# Patient Record
Sex: MALE | Race: OTHER | Employment: FULL TIME | URBAN - METROPOLITAN AREA
[De-identification: names, ages, dates, MRNs, and addresses within clinical notes are randomized per-mention and may not be internally consistent; named-entity substitution may affect disease eponyms.]

---

## 2021-02-05 ENCOUNTER — HOSPITAL ENCOUNTER (OUTPATIENT)
Dept: MRI IMAGING | Age: 26
Discharge: HOME | End: 2021-02-05
Attending: FAMILY MEDICINE
Payer: OTHER GOVERNMENT

## 2021-02-05 DIAGNOSIS — G89.29 CHRONIC PAIN OF LEFT WRIST: ICD-10-CM

## 2021-02-05 DIAGNOSIS — M25.532 CHRONIC PAIN OF LEFT WRIST: ICD-10-CM

## 2021-02-05 PROCEDURE — 73221 MRI JOINT UPR EXTREM W/O DYE: CPT

## 2021-04-03 ENCOUNTER — APPOINTMENT (OUTPATIENT)
Dept: GENERAL RADIOLOGY | Age: 26
End: 2021-04-03
Attending: PHYSICIAN ASSISTANT
Payer: OTHER GOVERNMENT

## 2021-04-03 ENCOUNTER — HOSPITAL ENCOUNTER (EMERGENCY)
Age: 26
Discharge: HOME | End: 2021-04-03
Attending: EMERGENCY MEDICINE
Payer: OTHER GOVERNMENT

## 2021-04-03 VITALS
DIASTOLIC BLOOD PRESSURE: 78 MMHG | WEIGHT: 200 LBS | OXYGEN SATURATION: 97 % | RESPIRATION RATE: 17 BRPM | TEMPERATURE: 98 F | SYSTOLIC BLOOD PRESSURE: 148 MMHG | BODY MASS INDEX: 29.62 KG/M2 | HEIGHT: 69 IN | HEART RATE: 74 BPM

## 2021-04-03 DIAGNOSIS — T14.8XXA MUSCLE STRAIN: ICD-10-CM
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
## 2021-04-03 PROCEDURE — 99283 EMERGENCY DEPT VISIT LOW MDM: CPT

## 2021-04-03 PROCEDURE — 73030 X-RAY EXAM OF SHOULDER: CPT

## 2021-04-03 PROCEDURE — 72040 X-RAY EXAM NECK SPINE 2-3 VW: CPT
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
## 2021-04-03 NOTE — ED NOTES
I have reviewed discharge instructions with the patient. The patient verbalized understanding. Patient left ED via Discharge Method: ambulatory to Home with spouse. Opportunity for questions and clarification provided. Patient given 0 scripts. To continue your aftercare when you leave the hospital, you may receive an automated call from our care team to check in on how you are doing. This is a free service and part of our promise to provide the best care and service to meet your aftercare needs.  If you have questions, or wish to unsubscribe from this service please call [phone]. Thank you for Choosing our [facility] Emergency Department.

## 2021-04-05 ENCOUNTER — HOSPITAL ENCOUNTER (EMERGENCY)
Age: 26
Discharge: HOME | End: 2021-04-05
Attending: EMERGENCY MEDICINE
Payer: OTHER GOVERNMENT

## 2021-04-05 VITALS
HEIGHT: 69 IN | RESPIRATION RATE: 16 BRPM | WEIGHT: 200 LBS | DIASTOLIC BLOOD PRESSURE: 77 MMHG | BODY MASS INDEX: 29.62 KG/M2 | TEMPERATURE: 98.2 F | HEART RATE: 80 BPM | OXYGEN SATURATION: 96 % | SYSTOLIC BLOOD PRESSURE: 127 MMHG

## 2021-04-05 DIAGNOSIS — M54.2 MUSCLE PAIN, CERVICAL: Primary | ICD-10-CM
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
## 2021-04-05 PROCEDURE — 99282 EMERGENCY DEPT VISIT SF MDM: CPT

## 2021-04-05 RX ORDER — CYCLOBENZAPRINE HCL 10 MG
10 TABLET ORAL
Qty: 10 TAB | Refills: 0 | Status: SHIPPED | OUTPATIENT
Start: 2021-04-05 | End: 2021-04-15

## 2021-04-06 NOTE — ED NOTES
I have reviewed discharge instructions with the patient. The patient verbalized understanding. Patient left ED via Discharge Method: ambulatory to Home with self. Opportunity for questions and clarification provided. Patient given 1 scripts. Education was given with verbal feedback to nurse. The pt was in no acute distress at [place]. To continue your aftercare when you leave the hospital, you may receive an automated call from our care team to check in on how you are doing. This is a free service and part of our promise to provide the best care and service to meet your aftercare needs.  If you have questions, or wish to unsubscribe from this service please call [phone]. Thank you for Choosing our [address] [place] [address] Emergency Department.
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
## 2021-10-01 ENCOUNTER — HOSPITAL ENCOUNTER (OUTPATIENT)
Dept: CT IMAGING | Age: 26
Discharge: HOME | End: 2021-10-01
Attending: PHYSICIAN ASSISTANT

## 2021-10-01 DIAGNOSIS — M26.609 TMJ (TEMPOROMANDIBULAR JOINT DISORDER): ICD-10-CM

## 2021-10-01 DIAGNOSIS — R68.84 JAW PAIN: ICD-10-CM

## 2021-10-01 RX ORDER — SODIUM CHLORIDE 0.9 % (FLUSH) 0.9 %
10 SYRINGE (ML) INJECTION
Status: COMPLETED | OUTPATIENT
Start: 2021-10-01 | End: 2021-10-01

## 2021-10-01 RX ADMIN — Medication 10 ML: at 11:03

## 2021-10-06 ENCOUNTER — HOSPITAL ENCOUNTER (OUTPATIENT)
Dept: CT IMAGING | Age: 26
Discharge: HOME | End: 2021-10-06
Attending: FAMILY MEDICINE

## 2021-10-06 DIAGNOSIS — R10.31 RLQ ABDOMINAL PAIN: ICD-10-CM

## 2021-10-06 RX ORDER — SODIUM CHLORIDE 0.9 % (FLUSH) 0.9 %
10 SYRINGE (ML) INJECTION
Status: COMPLETED | OUTPATIENT
Start: 2021-10-06 | End: 2021-10-06

## 2021-10-06 RX ADMIN — Medication 10 ML: at 13:20

## 2022-03-18 ENCOUNTER — HOSPITAL ENCOUNTER (OUTPATIENT)
Dept: LAB | Age: 27
Discharge: HOME | End: 2022-03-18

## 2022-03-18 PROCEDURE — 88305 TISSUE EXAM BY PATHOLOGIST: CPT

## 2022-03-18 PROCEDURE — 88312 SPECIAL STAINS GROUP 1: CPT

## 2022-03-30 ENCOUNTER — APPOINTMENT (OUTPATIENT)
Dept: PHYSICAL THERAPY | Age: 27
End: 2022-03-30
Attending: FAMILY MEDICINE

## 2022-04-11 ENCOUNTER — HOSPITAL ENCOUNTER (OUTPATIENT)
Dept: PHYSICAL THERAPY | Age: 27
Discharge: HOME | End: 2022-04-11
Attending: FAMILY MEDICINE
Payer: OTHER GOVERNMENT

## 2022-04-11 DIAGNOSIS — M54.50 CHRONIC LOW BACK PAIN, UNSPECIFIED BACK PAIN LATERALITY, UNSPECIFIED WHETHER SCIATICA PRESENT: ICD-10-CM

## 2022-04-11 DIAGNOSIS — G89.29 CHRONIC LOW BACK PAIN, UNSPECIFIED BACK PAIN LATERALITY, UNSPECIFIED WHETHER SCIATICA PRESENT: ICD-10-CM

## 2022-04-11 DIAGNOSIS — R29.898 RIGHT LEG WEAKNESS: ICD-10-CM

## 2022-04-11 PROCEDURE — 97750 PHYSICAL PERFORMANCE TEST: CPT

## 2022-04-11 NOTE — THERAPY EVALUATION
Everet Skiff  : 1995  Primary: [facility]  Secondary:  [address] at [facility]  [address], [address], [place] [address]  [phone]   [phone]      OUTPATIENT PHYSICAL THERAPY: FUNCTIONAL CAPACITY EVALUATION    ICD-10: Treatment Diagnosis:  Pain in R shoulder M25.511   Pain in R knee M25.561   Pain in R hip M25.551   Low back pain M54.50   Cervicalgia M54.20     REFERRING PHYSICIAN:  Zo Bautista MD Orders: FCE  Return Physician Appointment: TBD  MEDICAL/REFERRING DIAGNOSIS: Right leg weakness (R29.898); Chronic low back pain, unspecified back pain laterality, unspecified whether sciatica present (M54.50, G89.29)  DATE OF ONSET: Chronic,  - current date  PRIOR LEVEL OF FUNCTION: Works full time as  for [employer]   PRECAUTIONS/ALLERGIES: Patient has no known allergies. Ambulatory/Rehab Services H2 Model Falls Risk Assessment    Risk Factors:       (1)  Gender [Male] Ability to Rise from Chair:       (0)  Ability to rise in a single movement    Falls Prevention Plan:       No modifications necessary   Total: (5 or greater = High Risk): 1     [facility] of [employer]. All Rights Reserved. [facility] Patent #[id number]. Federal Law prohibits the replication, distribution or use without written permission from [facility] of [facility]:?????? ? ? This section established at most recent assessment?????????? Everet Skiff completes FCE testing today with the following results:    PURPOSE OF ASSESSMENT  Mr. Tanvir Painting was referred for a Functional Capacity Evaluation to determine his ability to perform the duties of any occupation. Mr. Tanvir Painting has been referred with the diagnoses of Pain in R shoulder, Pain in R knee, Pain in R hip, Low back pain and Cervicalgia. He was present for evaluation on 2022 for a period of 1 hours and 58 minutes.     RELIABILITY AND CONSISTENCY OF EFFORT  The results of this evaluation suggest that Mr. Zak Michel gave a self-limited effort, with 41 consistency measures yielding a reliability score of 60 out of 88 (68%). He displayed moments of pain and would jerk around while doing tasks in an exaggerated manner. He would walk slowly during tests and then be observed walking normally at a quicker pace. His presentation was not consistent. FUNCTIONAL ABILITIES  Subject demonstrated sufficient strength to perform the following activities: In the Medium category: Carrying. In the Light category: Mid Lift, Low Lift, Full Lift, Overall Strength. In the Sedentary category: High Lift. Subject is able to perform the following activities: On a Frequent basis: Bending, Crouching, Reach Immediate (Front)  Right, Reach Immediate (Front)  Left, Handling Right, Handling Left, Climbing Stairs. On an Occasional basis: Sitting, Standing, Walking, Balance, Kneeling, Reach Overhead (Front)  Right. FUNCTIONAL LIMITATIONS  Subject did not demonstrate the ability to perform the following activities: Pushing, Pulling, Reach Overhead [employer])  Left. Deficits were observed in the following range of motion tests: Lumbar Lateral Flexion Left, Lumbar Flexion, Lumbar Extension. Unless otherwise noted, range of motion deficits do not indicate the presence of a functional limitation. CONCLUSIONS  Results of this evaluation are an indicator of Mr. Gracia's minimal ability to perform the above-listed work tasks. However, due to concerns regarding his reliability of effort during this evaluation, demonstrated results may not depict his full ability in each task. Pt consistency range was only 68%. With walking on level ground, he would walk slowly but when not being tested he could walk at a faster less labored speed. He even got up to 2.8 mph on the treadmill then stopped due to his back tightening up. He then proceeded to grab a chair and walk with it behind him.  Additionally, he slowly walked up the stairs but would jog quickly down them without using railings. He was very inconsistent with his presentation throughout today's test but did report 10/10 pain at times and would exaggeratedly grab at his R latissimus area. Based on these inconsistent ARCON results, recommended lift restrictions are as follows: floor to waist 20 lbs, knee to chest 15 lbs, chest to overhead 10 lbs, push 25 lbs, and pull 35 lbs. PLAN OF CARE:No further physical therapy follow-up is planned as orders were for FCE testing only. Regarding Rayray Gracia's therapy, I certify that the treatment plan above will be carried out by a therapist or under their direction. Thank you for this referral,  Dali Leone, PT                   SUBJECTIVE:Please see Arcon test results  OBJECTIVE:Please see Arcon test results  TREATMENT:    (In addition to Assessment/Re-Assessment sessions the following treatments were rendered)  FCE ( 118 min.) Patient completes FCE testing with the Arcon system.  Test report will be scanned into medical record.  ______________________________________________________________________________________________________  Total Treatment Duration: 118 min physical performance testing  PT Patient Time In/Time Out  Time In: 1530  Time Out: [facility], PT

## 2022-06-06 ENCOUNTER — TELEPHONE (OUTPATIENT)
Dept: FAMILY MEDICINE CLINIC | Facility: CLINIC | Age: 27
End: 2022-06-06

## 2022-06-06 ENCOUNTER — TELEMEDICINE (OUTPATIENT)
Dept: FAMILY MEDICINE CLINIC | Facility: CLINIC | Age: 27
End: 2022-06-06
Payer: OTHER GOVERNMENT

## 2022-06-06 DIAGNOSIS — G89.29 CHRONIC PAIN OF BOTH WRISTS: ICD-10-CM

## 2022-06-06 DIAGNOSIS — M25.532 CHRONIC PAIN OF BOTH WRISTS: ICD-10-CM

## 2022-06-06 DIAGNOSIS — M25.531 CHRONIC PAIN OF BOTH WRISTS: ICD-10-CM

## 2022-06-06 DIAGNOSIS — E55.9 VITAMIN D DEFICIENCY: ICD-10-CM

## 2022-06-06 DIAGNOSIS — M25.631 DECREASED RANGE OF MOTION OF RIGHT WRIST: Primary | ICD-10-CM

## 2022-06-06 PROCEDURE — 99214 OFFICE O/P EST MOD 30 MIN: CPT | Performed by: FAMILY MEDICINE

## 2022-06-06 NOTE — TELEPHONE ENCOUNTER
----- Message from Urge sent at 6/6/2022  3:04 PM EDT -----  Subject: Referral Request    QUESTIONS   Reason for referral request? His pain management doctor told him he should   call his primary doctor to be seen by endocrinology. Has the physician seen you for this condition before? No   Preferred Specialist (if applicable)? Do you already have an appointment scheduled? No  Additional Information for Provider?   ---------------------------------------------------------------------------  --------------  CALL BACK INFO  What is the best way for the office to contact you? OK to leave message on   voicemail  Preferred Call Back Phone Number? [phone]  ---------------------------------------------------------------------------  --------------  SCRIPT ANSWERS  Relationship to Patient?  Self
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
## 2022-06-30 ENCOUNTER — HOSPITAL ENCOUNTER (OUTPATIENT)
Dept: CT IMAGING | Age: 27
Discharge: HOME | End: 2022-07-03

## 2022-06-30 ENCOUNTER — TELEMEDICINE (OUTPATIENT)
Dept: FAMILY MEDICINE CLINIC | Facility: CLINIC | Age: 27
End: 2022-06-30
Payer: OTHER GOVERNMENT

## 2022-06-30 ENCOUNTER — TELEPHONE (OUTPATIENT)
Dept: FAMILY MEDICINE CLINIC | Facility: CLINIC | Age: 27
End: 2022-06-30

## 2022-06-30 ENCOUNTER — NURSE TRIAGE (OUTPATIENT)
Dept: OTHER | Facility: CLINIC | Age: 27
End: 2022-06-30

## 2022-06-30 DIAGNOSIS — G44.52 NEW DAILY PERSISTENT HEADACHE: Primary | ICD-10-CM

## 2022-06-30 DIAGNOSIS — G44.52 NEW DAILY PERSISTENT HEADACHE: ICD-10-CM

## 2022-06-30 PROCEDURE — 99214 OFFICE O/P EST MOD 30 MIN: CPT | Performed by: FAMILY MEDICINE

## 2022-06-30 RX ORDER — PREDNISONE 10 MG/1
TABLET ORAL
Qty: 21 EACH | Refills: 0 | Status: SHIPPED | OUTPATIENT
Start: 2022-06-30 | End: 2022-08-24 | Stop reason: CLARIF

## 2022-06-30 SDOH — ECONOMIC STABILITY: FOOD INSECURITY: WITHIN THE PAST 12 MONTHS, THE FOOD YOU BOUGHT JUST DIDN'T LAST AND YOU DIDN'T HAVE MONEY TO GET MORE.: NEVER TRUE

## 2022-06-30 SDOH — ECONOMIC STABILITY: FOOD INSECURITY: WITHIN THE PAST 12 MONTHS, YOU WORRIED THAT YOUR FOOD WOULD RUN OUT BEFORE YOU GOT MONEY TO BUY MORE.: NEVER TRUE

## 2022-06-30 ASSESSMENT — PATIENT HEALTH QUESTIONNAIRE - PHQ9
9. THOUGHTS THAT YOU WOULD BE BETTER OFF DEAD, OR OF HURTING YOURSELF: 0
6. FEELING BAD ABOUT YOURSELF - OR THAT YOU ARE A FAILURE OR HAVE LET YOURSELF OR YOUR FAMILY DOWN: 0
2. FEELING DOWN, DEPRESSED OR HOPELESS: 1
1. LITTLE INTEREST OR PLEASURE IN DOING THINGS: 3
7. TROUBLE CONCENTRATING ON THINGS, SUCH AS READING THE NEWSPAPER OR WATCHING TELEVISION: 3
4. FEELING TIRED OR HAVING LITTLE ENERGY: 3
3. TROUBLE FALLING OR STAYING ASLEEP: 1
SUM OF ALL RESPONSES TO PHQ QUESTIONS 1-9: 15
SUM OF ALL RESPONSES TO PHQ QUESTIONS 1-9: 15
5. POOR APPETITE OR OVEREATING: 3
10. IF YOU CHECKED OFF ANY PROBLEMS, HOW DIFFICULT HAVE THESE PROBLEMS MADE IT FOR YOU TO DO YOUR WORK, TAKE CARE OF THINGS AT HOME, OR GET ALONG WITH OTHER PEOPLE: 2
SUM OF ALL RESPONSES TO PHQ QUESTIONS 1-9: 15
SUM OF ALL RESPONSES TO PHQ9 QUESTIONS 1 & 2: 4
SUM OF ALL RESPONSES TO PHQ QUESTIONS 1-9: 15
8. MOVING OR SPEAKING SO SLOWLY THAT OTHER PEOPLE COULD HAVE NOTICED. OR THE OPPOSITE, BEING SO FIGETY OR RESTLESS THAT YOU HAVE BEEN MOVING AROUND A LOT MORE THAN USUAL: 1

## 2022-06-30 ASSESSMENT — SOCIAL DETERMINANTS OF HEALTH (SDOH): HOW HARD IS IT FOR YOU TO PAY FOR THE VERY BASICS LIKE FOOD, HOUSING, MEDICAL CARE, AND HEATING?: NOT HARD AT ALL

## 2022-06-30 NOTE — TELEPHONE ENCOUNTER
----- Message from Annie Best DO sent at 6/30/2022  4:11 PM EDT -----  Head CT is normal. I've sent a steroid pack to his pharmacy to break this migraine cycle. I recommend calling his neurologist and letting them know he's having migraines that require treatment.

## 2022-06-30 NOTE — PROGRESS NOTES
Donnie Multani is a 32 y.o. male who was seen by synchronous (real-time) audio-video technology on 6/30/2022. Subjective:   Donnie Multani was seen for Migraine (head just feels really tight.)  pt has never had a HA this bad before. Doesn't want to look at phone or TV because makes it worse. It started 1 mo ago. Made worse when his neck is tight. This morning it seemed to get worse. Says it is worse than normal. No slurred speech  Hasn't taken anything for the HA    He told triage that he had TBI in 2014 and 2017. Since 2017, he's been having episodes of migraines that come and go    He started cymbalta about 3 wks ago. No Known Allergies      Objective:     General: alert, cooperative, mild distress and appears uncomfortable. will go stretches where he talks with his eyes closed   Mental  status: mental status: alert, oriented to person, place, and time, slower motor activity but could be from HA   Resp: No distress. Neuro: No acute deficits, no slurred speech or facial droop   Skin: skin: no discoloration or lesions of concern on visible areas     Due to this being a TeleHealth evaluation, many elements of the physical examination are unable to be assessed. Assessment & Plan:   Masseil Arthur was seen today for migraine. Diagnoses and all orders for this visit:    New daily persistent headache  -     CT HEAD W WO CONTRAST; Future    sending for stat CT. He is already established with neuro, so I encouraged him if it comes back normal, to f/u with them for HA mgmt. If HA is nml, I will send in steroid pack          CPT Codes 97549-96210 for Established Patients may apply to this Telehealth Visit    Donnie Multani was evaluated through a synchronous (real-time) audio-video encounter. The patient (or guardian if applicable) is aware that this is a billable service, which includes applicable co-pays. This Virtual Visit was conducted with patient's (and/or leg guardian's) consent.  The visit was conducted pursuant to the emergency declaration under the [facility] waiver authority and the [employer] and [employer] General Act. Patient identification was verified, and a caregiver was present when appropriate. The patient was located in a state where the provider was licensed to provide care. I was in the office while conducting this encounter. Pt was at home. We discussed the expected course, resolution and complications of the diagnosis(es) in detail. Medication risks, benefits, costs, interactions, and alternatives were discussed as indicated. I advised him to contact the office if his condition worsens, changes or fails to improve as anticipated. He expressed understanding with the diagnosis(es) and plan.      Morgan Proctor,

## 2022-06-30 NOTE — TELEPHONE ENCOUNTER
Received call from Zenaida at [facility] with [employer]. Subjective: Caller states \"I am having a migraine. I have had them in the past.\"     Current Symptoms: Reports migraine, increased restlessness, lightheadedness, hx of TBI 2014 and 2017, unilateral weakness to the R side that patient states he is being seen for with no new or worsening sx, balance issues at times, memory issues. Denies CP, SOB, palpitations, dizziness, blurry vision, visual disturbances, head inj, confusion, carbon monoxide exposure. Onset: 3 months ago; gradual and worsening     Associated Symptoms: NA    Pain Severity: 8/10; aching; constant, intermittent    Temperature: denies    What has been tried: unsure    LMP: NA Pregnant: NA    Recommended disposition: Go to ED/UCC Now (Or to Office with PCP Approval)    Care advice provided, patient verbalizes understanding; denies any other questions or concerns; instructed to call back for any new or worsening symptoms. Writer provided warm transfer to Dc Bear at [facility] for 2nd level     Attention Provider: Thank you for allowing me to participate in the care of your patient. The patient was connected to triage in response to information provided to the ECC/PSC. Please do not respond through this encounter as the response is not directed to a shared pool.     Reason for Disposition   SEVERE headache, states 'worst headache' of life    Protocols used: HEADACHE-ADULT-[place]

## 2022-06-30 NOTE — TELEPHONE ENCOUNTER
Spoke with pt and informed him of the medication being at the pharmacy. Pt stated that he understood.  Also, he agreed to follow up with his neurologist.

## 2022-07-28 ENCOUNTER — TELEPHONE (OUTPATIENT)
Dept: FAMILY MEDICINE CLINIC | Facility: CLINIC | Age: 27
End: 2022-07-28

## 2022-07-28 NOTE — TELEPHONE ENCOUNTER
----- Message from Bethanie Bella sent at 7/28/2022 10:19 AM EDT -----  Subject: Message to Provider    QUESTIONS  Information for Provider? is on medication for his sleeping and is   oversleeping alot wants to know if he can get a letter for that or if he   needs a appt. cb   ---------------------------------------------------------------------------  --------------  Brandon CRUZ  [phone]; OK to leave message on voicemail  ---------------------------------------------------------------------------  --------------  SCRIPT ANSWERS  Relationship to Patient?  Self

## 2022-07-29 ENCOUNTER — NURSE TRIAGE (OUTPATIENT)
Dept: OTHER | Facility: CLINIC | Age: 27
End: 2022-07-29

## 2022-07-29 NOTE — TELEPHONE ENCOUNTER
Notified patient and left voicemail to call the office back to get an appointment schedule with one of the other providers.

## 2022-07-29 NOTE — TELEPHONE ENCOUNTER
Received call from [facility] with [employer] Complaint. Subjective: Caller states \"been sleeping a lot and over sleeping and is due to medication. I am just getting really fatigue and tired all the time. When on this medication with my condition, my work is needing some letter that it is ok to perform duties at work or not. \"     Current Symptoms: Fatigue, tired all the time    Onset: years      Associated Symptoms: NA    Pain Severity: 7/10; pain; constant    Temperature:   Denies Fever    What has been tried: Prescribed medication    LMP: NA Pregnant: NA    Recommended disposition: Go to Office Now    Care advice provided, patient verbalizes understanding; denies any other questions or concerns; instructed to call back for any new or worsening symptoms. Patient/Caller agrees with recommended disposition; writer provided warm transfer to [employer]  at [facility] for appointment scheduling     Attention Provider: Thank you for allowing me to participate in the care of your patient. The patient was connected to triage in response to information provided to the ECC/PSC. Please do not respond through this encounter as the response is not directed to a shared pool.         Reason for Disposition   MODERATE weakness (i.e., interferes with work, school, normal activities) and cause unknown (Exceptions: weakness with acute minor illness, or weakness from poor fluid intake)    Protocols used: Weakness (Generalized) and Fatigue-ADULT-[place]

## 2022-07-29 NOTE — TELEPHONE ENCOUNTER
Mike Ortiz it looks as if Dr Devin Fonseca recommended an apt so please give pt an apt for this issue

## 2022-08-24 ENCOUNTER — TELEPHONE (OUTPATIENT)
Dept: FAMILY MEDICINE CLINIC | Facility: CLINIC | Age: 27
End: 2022-08-24

## 2022-08-24 ENCOUNTER — TELEMEDICINE (OUTPATIENT)
Dept: FAMILY MEDICINE CLINIC | Facility: CLINIC | Age: 27
End: 2022-08-24
Payer: OTHER GOVERNMENT

## 2022-08-24 DIAGNOSIS — R11.0 NAUSEA: Primary | ICD-10-CM

## 2022-08-24 DIAGNOSIS — R19.7 DIARRHEA, UNSPECIFIED TYPE: ICD-10-CM

## 2022-08-24 PROCEDURE — 99213 OFFICE O/P EST LOW 20 MIN: CPT | Performed by: NURSE PRACTITIONER

## 2022-08-24 RX ORDER — ONDANSETRON 4 MG/1
4 TABLET, FILM COATED ORAL DAILY PRN
Qty: 30 TABLET | Refills: 0 | Status: SHIPPED | OUTPATIENT
Start: 2022-08-24 | End: 2022-10-19 | Stop reason: CLARIF

## 2022-08-24 RX ORDER — BACLOFEN 10 MG/1
TABLET ORAL
COMMUNITY
Start: 2022-06-29 | End: 2022-10-19 | Stop reason: CLARIF

## 2022-08-24 RX ORDER — TIZANIDINE 4 MG/1
4 TABLET ORAL EVERY 6 HOURS PRN
COMMUNITY
Start: 2022-08-22

## 2022-08-24 ASSESSMENT — PATIENT HEALTH QUESTIONNAIRE - PHQ9
1. LITTLE INTEREST OR PLEASURE IN DOING THINGS: 0
SUM OF ALL RESPONSES TO PHQ9 QUESTIONS 1 & 2: 0
SUM OF ALL RESPONSES TO PHQ QUESTIONS 1-9: 0
SUM OF ALL RESPONSES TO PHQ QUESTIONS 1-9: 0
2. FEELING DOWN, DEPRESSED OR HOPELESS: 0
SUM OF ALL RESPONSES TO PHQ QUESTIONS 1-9: 0
SUM OF ALL RESPONSES TO PHQ QUESTIONS 1-9: 0

## 2022-08-24 NOTE — TELEPHONE ENCOUNTER
----- Message from Darling Catalino sent at 8/24/2022  2:51 PM EDT -----  Subject: Message to Provider    QUESTIONS  Information for Provider? Patient is calling because he was seen today   8/24/22 by Duran Josue and was supposed to receive a note to return   back to work on Friday, he still hasn't received it. Please email it him   at [email or web address]. Please contact patient when it is sent. Please   advise  ---------------------------------------------------------------------------  --------------  Melissa [facility] INFO  [phone]; OK to leave message on voicemail  ---------------------------------------------------------------------------  --------------  SCRIPT ANSWERS  Relationship to Patient?  Self

## 2022-08-24 NOTE — TELEPHONE ENCOUNTER
I sent Lilly's response back through [employer] to the patient.      Darryle Canterbury, APRN - NP  You; Dieudonne Rodriguez MA 33 minutes ago (5:14 PM)     EK  His letter is in my chart he just has to down load it

## 2022-08-24 NOTE — PROGRESS NOTES
Olga Shankar (:  1995) is a Established patient, here for evaluation of the following:    Assessment & Plan   Below is the assessment and plan developed based on review of pertinent history, physical exam, labs, studies, and medications. 1. Nausea  -     ondansetron (ZOFRAN) 4 MG tablet; Take 1 tablet by mouth daily as needed for Nausea or Vomiting, Disp-30 tablet, R-0Normal  2. Diarrhea, unspecified type  No follow-ups on file. Sounds like a viral ge is to push fluids joselyn med for nausea repeat Covid test tomorrow. Failure to improve, or worsening symptoms return. Subjective   HPI he has called due to headache nausea and stomach ache tested for Covid was negative. Woke up at 3 am with diarrhea has been on toilet all day. Some nausea no vomiting. He is back at home. Review of Systems       Objective   Patient-Reported Vitals  No data recorded     Physical Exam  Sound well   Phone only encounter lasted 11 min           Olga Shankar, was evaluated through a synchronous (real-time) phone onlyencounter. The patient (or guardian if applicable) is aware that this is a billable service, which includes applicable co-pays. This Virtual Visit was conducted with patient's (and/or legal guardian's) consent. The visit was conducted pursuant to the emergency declaration under the [address] [place] [address], [address] [place] [address] authority and the [employer] General Act. Patient identification was verified, and a caregiver was present when appropriate. The patient was located at Home: [address] [place] [address] [address] [id number]. Provider was located at [facility] (Appt Dept): [id number],  [address].         --MARY Grier NP

## 2022-09-16 ENCOUNTER — HOSPITAL ENCOUNTER (EMERGENCY)
Age: 27
Discharge: HOME | End: 2022-09-16
Attending: EMERGENCY MEDICINE
Payer: OTHER GOVERNMENT

## 2022-09-16 VITALS
HEART RATE: 101 BPM | TEMPERATURE: 99.1 F | RESPIRATION RATE: 16 BRPM | WEIGHT: 220 LBS | SYSTOLIC BLOOD PRESSURE: 131 MMHG | OXYGEN SATURATION: 97 % | HEIGHT: 69 IN | DIASTOLIC BLOOD PRESSURE: 78 MMHG | BODY MASS INDEX: 32.58 KG/M2

## 2022-09-16 DIAGNOSIS — R11.2 NAUSEA VOMITING AND DIARRHEA: Primary | ICD-10-CM

## 2022-09-16 DIAGNOSIS — R10.9 ACUTE ABDOMINAL PAIN: ICD-10-CM

## 2022-09-16 DIAGNOSIS — R19.7 NAUSEA VOMITING AND DIARRHEA: Primary | ICD-10-CM

## 2022-09-16 LAB
ALBUMIN SERPL-MCNC: 5 G/DL (ref 3.5–5)
ALBUMIN/GLOB SERPL: 1.5 {RATIO}
ALP SERPL-CCNC: 69 U/L (ref 45–117)
ALT SERPL-CCNC: 52 U/L (ref 13–61)
ANION GAP SERPL CALC-SCNC: 12 MMOL/L (ref 7–16)
APPEARANCE UR: CLEAR
AST SERPL-CCNC: 37 U/L (ref 15–37)
BACTERIA URNS QL MICRO: NORMAL /HPF
BASOPHILS # BLD: 0.1 K/UL (ref 0–0.2)
BASOPHILS NFR BLD: 1 % (ref 0–2)
BILIRUB SERPL-MCNC: 0.6 MG/DL (ref 0.2–1.1)
BILIRUB UR QL: NEGATIVE
BUN SERPL-MCNC: 11 MG/DL (ref 7–18)
CALCIUM SERPL-MCNC: 9.4 MG/DL (ref 8.3–10.4)
CASTS URNS QL MICRO: 0 /LPF
CHLORIDE SERPL-SCNC: 102 MMOL/L (ref 98–107)
CO2 SERPL-SCNC: 26 MMOL/L (ref 21–32)
COLOR UR: ABNORMAL
CREAT SERPL-MCNC: 0.77 MG/DL (ref 0.8–1.5)
CRYSTALS URNS QL MICRO: 0 /LPF
DIFFERENTIAL METHOD BLD: ABNORMAL
EOSINOPHIL # BLD: 0.1 K/UL (ref 0–0.8)
EOSINOPHIL NFR BLD: 1 % (ref 0.5–7.8)
EPI CELLS #/AREA URNS HPF: 0 /HPF
ERYTHROCYTE [DISTWIDTH] IN BLOOD BY AUTOMATED COUNT: 12.6 % (ref 11.9–14.6)
GLOBULIN SER CALC-MCNC: 3.3 G/DL (ref 2.3–3.5)
GLUCOSE SERPL-MCNC: 109 MG/DL (ref 65–100)
GLUCOSE UR STRIP.AUTO-MCNC: NEGATIVE MG/DL
HCT VFR BLD AUTO: 50.5 % (ref 41.1–50.3)
HGB BLD-MCNC: 17.6 G/DL (ref 13.6–17.2)
HGB UR QL STRIP: NEGATIVE
IMM GRANULOCYTES # BLD AUTO: 0.1 K/UL (ref 0–0.5)
IMM GRANULOCYTES NFR BLD AUTO: 1 % (ref 0–5)
KETONES UR QL STRIP.AUTO: NEGATIVE MG/DL
LEUKOCYTE ESTERASE UR QL STRIP.AUTO: NEGATIVE
LIPASE SERPL-CCNC: 16 U/L (ref 13–60)
LYMPHOCYTES # BLD: 0.6 K/UL (ref 0.5–4.6)
LYMPHOCYTES NFR BLD: 5 % (ref 13–44)
MCH RBC QN AUTO: 29.5 PG (ref 26.1–32.9)
MCHC RBC AUTO-ENTMCNC: 34.9 G/DL (ref 31.4–35)
MCV RBC AUTO: 84.7 FL (ref 79.6–97.8)
MONOCYTES # BLD: 0.7 K/UL (ref 0.1–1.3)
MONOCYTES NFR BLD: 6 % (ref 4–12)
MUCOUS THREADS URNS QL MICRO: 0 /LPF
NEUTS SEG # BLD: 9.7 K/UL (ref 1.7–8.2)
NEUTS SEG NFR BLD: 87 % (ref 43–78)
NITRITE UR QL STRIP.AUTO: NEGATIVE
NRBC # BLD: 0 K/UL (ref 0–0.2)
OTHER OBSERVATIONS: NORMAL
PH UR STRIP: 8.5 [PH] (ref 5–9)
PLATELET # BLD AUTO: 291 K/UL (ref 150–450)
PMV BLD AUTO: 9.8 FL (ref 9.4–12.3)
POTASSIUM SERPL-SCNC: 4.1 MMOL/L (ref 3.5–5.1)
PROT SERPL-MCNC: 8.3 G/DL (ref 6.4–8.2)
PROT UR STRIP-MCNC: ABNORMAL MG/DL
RBC # BLD AUTO: 5.96 M/UL (ref 4.23–5.6)
RBC #/AREA URNS HPF: 0 /HPF
SODIUM SERPL-SCNC: 140 MMOL/L (ref 138–145)
SP GR UR REFRACTOMETRY: 1.02 (ref 1–1.02)
UROBILINOGEN UR QL STRIP.AUTO: 0.2 EU/DL (ref 0.2–1)
WBC # BLD AUTO: 11.3 K/UL (ref 4.3–11.1)
WBC URNS QL MICRO: NORMAL /HPF

## 2022-09-16 PROCEDURE — 85025 COMPLETE CBC W/AUTO DIFF WBC: CPT

## 2022-09-16 PROCEDURE — 96374 THER/PROPH/DIAG INJ IV PUSH: CPT

## 2022-09-16 PROCEDURE — 80053 COMPREHEN METABOLIC PANEL: CPT

## 2022-09-16 PROCEDURE — 81001 URINALYSIS AUTO W/SCOPE: CPT

## 2022-09-16 PROCEDURE — 2580000003 HC RX 258: Performed by: EMERGENCY MEDICINE

## 2022-09-16 PROCEDURE — 81003 URINALYSIS AUTO W/O SCOPE: CPT

## 2022-09-16 PROCEDURE — 99284 EMERGENCY DEPT VISIT MOD MDM: CPT

## 2022-09-16 PROCEDURE — 83690 ASSAY OF LIPASE: CPT

## 2022-09-16 PROCEDURE — 6360000002 HC RX W HCPCS: Performed by: EMERGENCY MEDICINE

## 2022-09-16 PROCEDURE — 96361 HYDRATE IV INFUSION ADD-ON: CPT

## 2022-09-16 PROCEDURE — 6370000000 HC RX 637 (ALT 250 FOR IP): Performed by: EMERGENCY MEDICINE

## 2022-09-16 RX ORDER — SODIUM CHLORIDE, SODIUM LACTATE, POTASSIUM CHLORIDE, AND CALCIUM CHLORIDE .6; .31; .03; .02 G/100ML; G/100ML; G/100ML; G/100ML
1000 INJECTION, SOLUTION INTRAVENOUS ONCE
Status: COMPLETED | OUTPATIENT
Start: 2022-09-16 | End: 2022-09-16

## 2022-09-16 RX ORDER — DIPHENOXYLATE HYDROCHLORIDE AND ATROPINE SULFATE 2.5; .025 MG/1; MG/1
1 TABLET ORAL 4 TIMES DAILY PRN
Qty: 12 TABLET | Refills: 0 | Status: SHIPPED | OUTPATIENT
Start: 2022-09-16 | End: 2022-09-26

## 2022-09-16 RX ORDER — ONDANSETRON 4 MG/1
4 TABLET, ORALLY DISINTEGRATING ORAL 3 TIMES DAILY PRN
Qty: 9 TABLET | Refills: 0 | Status: SHIPPED | OUTPATIENT
Start: 2022-09-16 | End: 2022-10-19 | Stop reason: CLARIF

## 2022-09-16 RX ORDER — ONDANSETRON 2 MG/ML
4 INJECTION INTRAMUSCULAR; INTRAVENOUS
Status: COMPLETED | OUTPATIENT
Start: 2022-09-16 | End: 2022-09-16

## 2022-09-16 RX ADMIN — HYOSCYAMINE SULFATE 250 MCG: 0.12 TABLET ORAL; SUBLINGUAL at 12:04

## 2022-09-16 RX ADMIN — ONDANSETRON 4 MG: 2 INJECTION INTRAMUSCULAR; INTRAVENOUS at 12:04

## 2022-09-16 RX ADMIN — SODIUM CHLORIDE, POTASSIUM CHLORIDE, SODIUM LACTATE AND CALCIUM CHLORIDE 1000 ML: 600; 310; 30; 20 INJECTION, SOLUTION INTRAVENOUS at 12:06

## 2022-09-16 ASSESSMENT — ENCOUNTER SYMPTOMS
ABDOMINAL PAIN: 1
DIARRHEA: 1
COUGH: 0
SHORTNESS OF BREATH: 0
NAUSEA: 1
BLOOD IN STOOL: 0

## 2022-09-16 ASSESSMENT — PAIN - FUNCTIONAL ASSESSMENT
PAIN_FUNCTIONAL_ASSESSMENT: 0-10
PAIN_FUNCTIONAL_ASSESSMENT: 0-10

## 2022-09-16 ASSESSMENT — PAIN SCALES - GENERAL
PAINLEVEL_OUTOF10: 2
PAINLEVEL_OUTOF10: 7
PAINLEVEL_OUTOF10: 4

## 2022-09-16 ASSESSMENT — PAIN DESCRIPTION - LOCATION
LOCATION: ABDOMEN

## 2022-09-16 NOTE — DISCHARGE INSTRUCTIONS
Sips clear liquids 6-12 hours, then [employer] (bananas, rice, apple sauce, toast). Advance to soup/sanwiches as tolerated. Recheck your doctor 2 days if not improving. Recheck sooner for worse pain/fever/vomiting/bleeding. Tylenol if fever. If prescribed, phenergan or Zofran are for Nausea. If prescribed, Lomotil is for diarrhea and cramping.

## 2022-09-16 NOTE — Clinical Note
Jona Bass was seen and treated in our emergency department on 9/16/2022. He may return to work on 09/19/2022. If you have any questions or concerns, please don't hesitate to call.       Pepper Zelaya MD

## 2022-09-16 NOTE — Clinical Note
Leandra Moy was seen and treated in our emergency department on 9/16/2022. He may return to work on 09/19/2022. If you have any questions or concerns, please don't hesitate to call.       Santiago Marie MD

## 2022-09-16 NOTE — ED NOTES
I have reviewed discharge instructions with the patient. The patient verbalized understanding. Patient left ED via Discharge Method: ambulatory to Home with Self. Opportunity for questions and clarification provided. Patient given 2 scripts. To continue your aftercare when you leave the hospital, you may receive an automated call from our care team to check in on how you are doing. This is a free service and part of our promise to provide the best care and service to meet your aftercare needs.  If you have questions, or wish to unsubscribe from this service please call [phone]. Thank you for Choosing our [facility] Emergency Department.         Colletta Coots, RN  09/16/22 9051

## 2022-09-16 NOTE — ED TRIAGE NOTES
\"Around 3am I started having diarrhea and vomiting. Now I feel weak. Now my stomach is tender and have pain. Last time vomited around 0900 and last diarrhea was 30 minutes. \"

## 2022-09-16 NOTE — ED PROVIDER NOTES
Emergency Department Provider Note                   PCP:                Kenneth Wagoner DO               Age: 32 y.o. Sex: male     No diagnosis found. DISPOSITION          MDM  Number of Diagnoses or Management Options  Diagnosis management comments: Suspected viral infection with gastroenteritis. Screening lab work. Any imaging dependent upon lab work. Hydration and symptom control. Amount and/or Complexity of Data Reviewed  Clinical lab tests: ordered and reviewed    Risk of Complications, Morbidity, and/or Mortality  Presenting problems: moderate  Diagnostic procedures: minimal  Management options: low    Patient Progress  Patient progress: stable             Orders Placed This Encounter   Procedures    CBC with Diff    CMP    Lipase    Urinalysis w rflx microscopic    Urinalysis, Micro    Diet NPO    Saline lock IV        Medications   hyoscyamine (LEVSIN/SL) sublingual tablet 250 mcg (has no administration in time range)   ondansetron (ZOFRAN) injection 4 mg (has no administration in time range)   lactated ringers bolus (has no administration in time range)       New Prescriptions    No medications on file        Lisa Bang is a 32 y.o. male who presents to the Emergency Department with chief complaint of  No chief complaint on file. Vomiting and diarrhea and abdominal cramping. This awoke patient at 3 AM.  Describes cramping in abdomen with minimal pain between episodes. No bleeding. Feels weak and slightly lightheaded but no syncope. No chest pain or shortness of breath. Daughter with gastroenteritis with vomiting and diarrhea as well. With history of cholecystectomy    The history is provided by the patient.    Nausea & Vomiting  Severity:  Moderate  Duration:  6 hours  Timing:  Intermittent  Quality:  Stomach contents  Progression:  Unchanged  Chronicity:  New  Recent urination:  Normal  Relieved by:  Nothing  Worsened by:  Nothing  Ineffective treatments:  None tried  Associated symptoms: abdominal pain, chills and diarrhea    Associated symptoms: no cough, no fever, no myalgias and no URI        Review of Systems   Constitutional:  Positive for chills and fatigue. Negative for fever. Respiratory:  Negative for cough and shortness of breath. Cardiovascular:  Negative for chest pain. Gastrointestinal:  Positive for abdominal pain, diarrhea and nausea. Negative for blood in stool. Genitourinary:  Negative for difficulty urinating and dysuria. Musculoskeletal:  Negative for myalgias. All other systems reviewed and are negative. Past Medical History:   Diagnosis Date    Cervical spondylosis     COVID-19 01/03/2022    Hypothyroidism due to Hashimoto's thyroiditis     MAO (obstructive sleep apnea) 2016    mild, CPAP    Right leg weakness     nml ncs 11/30/21    Vitamin D deficiency         Past Surgical History:   Procedure Laterality Date    CHOLECYSTECTOMY, LAPAROSCOPIC      ORTHOPEDIC SURGERY Bilateral 04/29/2022    cervical 4-6 medial branch block    WISDOM TOOTH EXTRACTION      2/4 removed        Family History   Problem Relation Age of Onset    No Known Problems Mother     Colon Cancer Neg Hx     No Known Problems Brother     No Known Problems Sister     No Known Problems Father         Social History     Socioeconomic History    Marital status:      Spouse name: None    Number of children: None    Years of education: None    Highest education level: None   Tobacco Use    Smoking status: Never    Smokeless tobacco: Never   Vaping Use    Vaping Use: Never used   Substance and Sexual Activity    Alcohol use: Never    Drug use: Never     Social Determinants of Health     Financial Resource Strain: Low Risk     Difficulty of Paying Living Expenses: Not hard at all   Food Insecurity: No Food Insecurity    Worried About Running Out of Food in the Last Year: Never true    Ran Out of Food in the Last Year: Never true         Patient has no known allergies. Previous Medications    BACLOFEN (LIORESAL) 10 MG TABLET    TAKE 1 TABLET BY MOUTH DAILY FOR 1 WEEK. INCREASE TO 10 MG 2 TIMES A DAY FOR MUSCLE SPASMS    DULOXETINE (CYMBALTA) 30 MG EXTENDED RELEASE CAPSULE    Take 30 mg by mouth daily     FAMOTIDINE (PEPCID) 40 MG TABLET    Take 40 mg by mouth    HYOSCYAMINE SULFATE SL 0.125 MG SUBL    Place 1 tablet under the tongue 2 times daily as needed    ONDANSETRON (ZOFRAN) 4 MG TABLET    Take 1 tablet by mouth daily as needed for Nausea or Vomiting    PANTOPRAZOLE (PROTONIX) 40 MG TABLET    Take 40 mg by mouth daily    SILDENAFIL (VIAGRA) 50 MG TABLET    Take 50 mg by mouth daily as needed    TIZANIDINE (ZANAFLEX) 4 MG TABLET    Take 4 mg by mouth every 6 hours as needed    VITAMIN D (CHOLECALCIFEROL) 41638 UNIT CAPS    Take 50,000 Units by mouth every 7 days        Vitals signs and nursing note reviewed. Patient Vitals for the past 4 hrs:   Temp Pulse Resp BP SpO2   09/16/22 1135 -- -- -- 130/83 96 %   09/16/22 1105 99.1 °F (37.3 °C) (!) 108 18 (!) 128/91 98 %          Physical Exam  Vitals and nursing note reviewed. Constitutional:       Appearance: He is not ill-appearing. HENT:      Head: Normocephalic and atraumatic. Right Ear: External ear normal.      Left Ear: External ear normal.      Mouth/Throat:      Mouth: Mucous membranes are moist.      Pharynx: Oropharynx is clear. Eyes:      General: No scleral icterus. Extraocular Movements: Extraocular movements intact. Pupils: Pupils are equal, round, and reactive to light. Cardiovascular:      Rate and Rhythm: Normal rate and regular rhythm. Pulmonary:      Effort: Pulmonary effort is normal.      Breath sounds: Normal breath sounds. Abdominal:      Palpations: Abdomen is soft. Tenderness: There is abdominal tenderness in the epigastric area and periumbilical area. Negative signs include Echeverria's sign and McBurney's sign. Musculoskeletal:         General: No swelling or tenderness. Right lower leg: No edema. Left lower leg: No edema. Skin:     General: Skin is warm and dry. Neurological:      General: No focal deficit present. Mental Status: He is alert.         Procedures    Results for orders placed or performed during the hospital encounter of 09/16/22   CBC with Diff   Result Value Ref Range    WBC 11.3 (H) 4.3 - 11.1 K/uL    RBC 5.96 (H) 4.23 - 5.60 M/uL    Hemoglobin 17.6 (H) 13.6 - 17.2 g/dL    Hematocrit 50.5 (H) 41.1 - 50.3 %    MCV 84.7 79.6 - 97.8 FL    MCH 29.5 26.1 - 32.9 PG    MCHC 34.9 31.4 - 35.0 g/dL    RDW 12.6 11.9 - 14.6 %    Platelets 663 892 - 757 K/uL    MPV 9.8 9.4 - 12.3 FL    nRBC 0.00 0.0 - 0.2 K/uL    Differential Type AUTOMATED      Seg Neutrophils 87 (H) 43 - 78 %    Lymphocytes 5 (L) 13 - 44 %    Monocytes 6 4.0 - 12.0 %    Eosinophils % 1 0.5 - 7.8 %    Basophils 1 0.0 - 2.0 %    Immature Granulocytes 1 0.0 - 5.0 %    Segs Absolute 9.7 (H) 1.7 - 8.2 K/UL    Absolute Lymph # 0.6 0.5 - 4.6 K/UL    Absolute Mono # 0.7 0.1 - 1.3 K/UL    Absolute Eos # 0.1 0.0 - 0.8 K/UL    Basophils Absolute 0.1 0.0 - 0.2 K/UL    Absolute Immature Granulocyte 0.1 0.0 - 0.5 K/UL   Urinalysis w rflx microscopic   Result Value Ref Range    Color, UA DARK YELLOW      Appearance CLEAR      Specific Gravity, UA 1.020 1.001 - 1.023      pH, Urine 8.5 5.0 - 9.0      Protein, UA TRACE (A) NEG mg/dL    Glucose, UA Negative mg/dL    Ketones, Urine Negative NEG mg/dL    Bilirubin Urine Negative NEG      Blood, Urine Negative NEG      Urobilinogen, Urine 0.2 0.2 - 1.0 EU/dL    Nitrite, Urine Negative NEG      Leukocyte Esterase, Urine Negative NEG          No orders to display          Results Include:    Recent Results (from the past 24 hour(s))   CBC with Diff    Collection Time: 09/16/22 11:35 AM   Result Value Ref Range    WBC 11.3 (H) 4.3 - 11.1 K/uL    RBC 5.96 (H) 4.23 - 5.60 M/uL    Hemoglobin 17.6 (H) 13.6 - 17.2 g/dL    Hematocrit 50.5 (H) 41.1 - 50.3 %    MCV 84.7 79.6 - 97.8 FL    MCH 29.5 26.1 - 32.9 PG    MCHC 34.9 31.4 - 35.0 g/dL    RDW 12.6 11.9 - 14.6 %    Platelets 117 911 - 443 K/uL    MPV 9.8 9.4 - 12.3 FL    nRBC 0.00 0.0 - 0.2 K/uL    Differential Type AUTOMATED      Seg Neutrophils 87 (H) 43 - 78 %    Lymphocytes 5 (L) 13 - 44 %    Monocytes 6 4.0 - 12.0 %    Eosinophils % 1 0.5 - 7.8 %    Basophils 1 0.0 - 2.0 %    Immature Granulocytes 1 0.0 - 5.0 %    Segs Absolute 9.7 (H) 1.7 - 8.2 K/UL    Absolute Lymph # 0.6 0.5 - 4.6 K/UL    Absolute Mono # 0.7 0.1 - 1.3 K/UL    Absolute Eos # 0.1 0.0 - 0.8 K/UL    Basophils Absolute 0.1 0.0 - 0.2 K/UL    Absolute Immature Granulocyte 0.1 0.0 - 0.5 K/UL   CMP    Collection Time: 09/16/22 11:35 AM   Result Value Ref Range    Sodium 140 138 - 145 mmol/L    Potassium 4.1 3.5 - 5.1 mmol/L    Chloride 102 98 - 107 mmol/L    CO2 26 21 - 32 mmol/L    Anion Gap 12 7.0 - 16.0 mmol/L    Glucose 109 (H) 65 - 100 mg/dL    BUN 11 7.0 - 18.0 MG/DL    Creatinine 0.77 (L) 0.8 - 1.5 MG/DL    GFR African American >154 >60 ml/min/1.73m2    GFR Non- >60 >60 ml/min/1.73m2    Calcium 9.4 8.3 - 10.4 MG/DL    Total Bilirubin 0.6 0.2 - 1.1 MG/DL    ALT 52 13.0 - 61.0 U/L    AST 37 15 - 37 U/L    Alk Phosphatase 69 45.0 - 117.0 U/L    Total Protein 8.3 (H) 6.4 - 8.2 g/dL    Albumin 5.0 3.5 - 5.0 g/dL    Globulin 3.3 2.3 - 3.5 g/dL    Albumin/Globulin Ratio 1.5     Lipase    Collection Time: 09/16/22 11:35 AM   Result Value Ref Range    Lipase 16 13 - 60 U/L   Urinalysis w rflx microscopic    Collection Time: 09/16/22 11:35 AM   Result Value Ref Range    Color, UA DARK YELLOW      Appearance CLEAR      Specific Gravity, UA 1.020 1.001 - 1.023      pH, Urine 8.5 5.0 - 9.0      Protein, UA TRACE (A) NEG mg/dL    Glucose, UA Negative mg/dL    Ketones, Urine Negative NEG mg/dL    Bilirubin Urine Negative NEG      Blood, Urine Negative NEG      Urobilinogen, Urine 0.2 0.2 - 1.0 EU/dL    Nitrite, Urine Negative NEG      Leukocyte Esterase, Urine Negative NEG     Urinalysis, Micro    Collection Time: 09/16/22 11:35 AM   Result Value Ref Range    WBC, UA 0-3 0 /hpf    RBC, UA 0 0 /hpf    Epithelial Cells UA 0 0 /hpf    BACTERIA, URINE TRACE 0 /hpf    Casts 0 0 /lpf    Crystals 0 0 /LPF    Mucus, UA 0 0 /lpf    OTHER OBSERVATIONS RESULTS VERIFIED MANUALLY            Symptomatic treatment       Voice dictation software was used during the making of this note. This software is not perfect and grammatical and other typographical errors may be present. This note has not been completely proofread for errors.      Palomo Garcia MD  09/16/22 1155       Palomo Garcia MD  09/16/22 1224

## 2022-09-17 ENCOUNTER — APPOINTMENT (OUTPATIENT)
Dept: GENERAL RADIOLOGY | Age: 27
End: 2022-09-17
Payer: OTHER GOVERNMENT

## 2022-09-17 ENCOUNTER — APPOINTMENT (OUTPATIENT)
Dept: CT IMAGING | Age: 27
End: 2022-09-17
Payer: OTHER GOVERNMENT

## 2022-09-17 ENCOUNTER — HOSPITAL ENCOUNTER (EMERGENCY)
Age: 27
Discharge: HOME | End: 2022-09-17
Attending: EMERGENCY MEDICINE
Payer: OTHER GOVERNMENT

## 2022-09-17 VITALS
TEMPERATURE: 98.4 F | WEIGHT: 220 LBS | HEIGHT: 69 IN | OXYGEN SATURATION: 100 % | RESPIRATION RATE: 17 BRPM | BODY MASS INDEX: 32.58 KG/M2 | DIASTOLIC BLOOD PRESSURE: 96 MMHG | SYSTOLIC BLOOD PRESSURE: 132 MMHG | HEART RATE: 109 BPM

## 2022-09-17 DIAGNOSIS — E86.0 DEHYDRATION: Primary | ICD-10-CM

## 2022-09-17 DIAGNOSIS — B34.9 VIRAL ILLNESS: ICD-10-CM

## 2022-09-17 LAB
ALBUMIN SERPL-MCNC: 4.7 G/DL (ref 3.5–5)
ALBUMIN/GLOB SERPL: 1.7 {RATIO}
ALP SERPL-CCNC: 65 U/L (ref 45–117)
ALT SERPL-CCNC: 45 U/L (ref 13–61)
ANION GAP SERPL CALC-SCNC: 12 MMOL/L (ref 7–16)
AST SERPL-CCNC: 31 U/L (ref 15–37)
BASOPHILS # BLD: 0 K/UL (ref 0–0.2)
BASOPHILS NFR BLD: 0 % (ref 0–2)
BILIRUB SERPL-MCNC: 0.6 MG/DL (ref 0.2–1.1)
BUN SERPL-MCNC: 10 MG/DL (ref 7–18)
CALCIUM SERPL-MCNC: 9.2 MG/DL (ref 8.3–10.4)
CHLORIDE SERPL-SCNC: 98 MMOL/L (ref 98–107)
CO2 SERPL-SCNC: 25 MMOL/L (ref 21–32)
CREAT SERPL-MCNC: 0.94 MG/DL (ref 0.8–1.5)
DIFFERENTIAL METHOD BLD: ABNORMAL
EOSINOPHIL # BLD: 0 K/UL (ref 0–0.8)
EOSINOPHIL NFR BLD: 0 % (ref 0.5–7.8)
ERYTHROCYTE [DISTWIDTH] IN BLOOD BY AUTOMATED COUNT: 12.6 % (ref 11.9–14.6)
GLOBULIN SER CALC-MCNC: 2.7 G/DL (ref 2.3–3.5)
GLUCOSE SERPL-MCNC: 118 MG/DL (ref 65–100)
HCT VFR BLD AUTO: 45.6 % (ref 41.1–50.3)
HGB BLD-MCNC: 16 G/DL (ref 13.6–17.2)
IMM GRANULOCYTES # BLD AUTO: 0.1 K/UL (ref 0–0.5)
IMM GRANULOCYTES NFR BLD AUTO: 1 % (ref 0–5)
LACTATE SERPL-SCNC: 1.1 MMOL/L (ref 0.4–2)
LIPASE SERPL-CCNC: 13 U/L (ref 13–60)
LYMPHOCYTES # BLD: 1.3 K/UL (ref 0.5–4.6)
LYMPHOCYTES NFR BLD: 12 % (ref 13–44)
MCH RBC QN AUTO: 30 PG (ref 26.1–32.9)
MCHC RBC AUTO-ENTMCNC: 35.1 G/DL (ref 31.4–35)
MCV RBC AUTO: 85.4 FL (ref 79.6–97.8)
MONOCYTES # BLD: 0.9 K/UL (ref 0.1–1.3)
MONOCYTES NFR BLD: 9 % (ref 4–12)
NEUTS SEG # BLD: 8.3 K/UL (ref 1.7–8.2)
NEUTS SEG NFR BLD: 78 % (ref 43–78)
NRBC # BLD: 0 K/UL (ref 0–0.2)
PLATELET # BLD AUTO: 265 K/UL (ref 150–450)
PMV BLD AUTO: 9.7 FL (ref 9.4–12.3)
POTASSIUM SERPL-SCNC: 3.3 MMOL/L (ref 3.5–5.1)
PROT SERPL-MCNC: 7.4 G/DL (ref 6.4–8.2)
RBC # BLD AUTO: 5.34 M/UL (ref 4.23–5.6)
SARS-COV-2 RDRP RESP QL NAA+PROBE: NOT DETECTED
SODIUM SERPL-SCNC: 135 MMOL/L (ref 138–145)
SOURCE: NORMAL
TROPONIN T SERPL HS-MCNC: 7.1 NG/L (ref 0–22)
TROPONIN T SERPL HS-MCNC: 7.1 NG/L (ref 0–22)
WBC # BLD AUTO: 10.6 K/UL (ref 4.3–11.1)

## 2022-09-17 PROCEDURE — 83690 ASSAY OF LIPASE: CPT

## 2022-09-17 PROCEDURE — 96375 TX/PRO/DX INJ NEW DRUG ADDON: CPT

## 2022-09-17 PROCEDURE — 99285 EMERGENCY DEPT VISIT HI MDM: CPT

## 2022-09-17 PROCEDURE — 96374 THER/PROPH/DIAG INJ IV PUSH: CPT

## 2022-09-17 PROCEDURE — 85025 COMPLETE CBC W/AUTO DIFF WBC: CPT

## 2022-09-17 PROCEDURE — 71260 CT THORAX DX C+: CPT | Performed by: EMERGENCY MEDICINE

## 2022-09-17 PROCEDURE — 96361 HYDRATE IV INFUSION ADD-ON: CPT

## 2022-09-17 PROCEDURE — 80053 COMPREHEN METABOLIC PANEL: CPT

## 2022-09-17 PROCEDURE — 71045 X-RAY EXAM CHEST 1 VIEW: CPT

## 2022-09-17 PROCEDURE — 6370000000 HC RX 637 (ALT 250 FOR IP): Performed by: EMERGENCY MEDICINE

## 2022-09-17 PROCEDURE — 6360000002 HC RX W HCPCS: Performed by: EMERGENCY MEDICINE

## 2022-09-17 PROCEDURE — 2580000003 HC RX 258: Performed by: EMERGENCY MEDICINE

## 2022-09-17 PROCEDURE — 6360000004 HC RX CONTRAST MEDICATION: Performed by: EMERGENCY MEDICINE

## 2022-09-17 PROCEDURE — 83605 ASSAY OF LACTIC ACID: CPT

## 2022-09-17 PROCEDURE — 84484 ASSAY OF TROPONIN QUANT: CPT

## 2022-09-17 PROCEDURE — 87635 SARS-COV-2 COVID-19 AMP PRB: CPT

## 2022-09-17 RX ORDER — PROMETHAZINE HYDROCHLORIDE 25 MG/1
25 TABLET ORAL 4 TIMES DAILY PRN
Qty: 20 TABLET | Refills: 0 | Status: SHIPPED | OUTPATIENT
Start: 2022-09-17 | End: 2022-09-24

## 2022-09-17 RX ORDER — LIDOCAINE HYDROCHLORIDE 20 MG/ML
15 SOLUTION OROPHARYNGEAL
Status: COMPLETED | OUTPATIENT
Start: 2022-09-17 | End: 2022-09-17

## 2022-09-17 RX ORDER — 0.9 % SODIUM CHLORIDE 0.9 %
1000 INTRAVENOUS SOLUTION INTRAVENOUS
Status: COMPLETED | OUTPATIENT
Start: 2022-09-17 | End: 2022-09-17

## 2022-09-17 RX ORDER — ONDANSETRON 2 MG/ML
4 INJECTION INTRAMUSCULAR; INTRAVENOUS
Status: COMPLETED | OUTPATIENT
Start: 2022-09-17 | End: 2022-09-17

## 2022-09-17 RX ORDER — LORAZEPAM 1 MG/1
1 TABLET ORAL EVERY 4 HOURS PRN
Status: DISCONTINUED | OUTPATIENT
Start: 2022-09-17 | End: 2022-09-17 | Stop reason: HOSPADM

## 2022-09-17 RX ORDER — MAGNESIUM HYDROXIDE/ALUMINUM HYDROXICE/SIMETHICONE 120; 1200; 1200 MG/30ML; MG/30ML; MG/30ML
30 SUSPENSION ORAL
Status: COMPLETED | OUTPATIENT
Start: 2022-09-17 | End: 2022-09-17

## 2022-09-17 RX ORDER — MORPHINE SULFATE 2 MG/ML
4 INJECTION, SOLUTION INTRAMUSCULAR; INTRAVENOUS
Status: COMPLETED | OUTPATIENT
Start: 2022-09-17 | End: 2022-09-17

## 2022-09-17 RX ORDER — ASPIRIN 81 MG/1
324 TABLET, CHEWABLE ORAL
Status: COMPLETED | OUTPATIENT
Start: 2022-09-17 | End: 2022-09-17

## 2022-09-17 RX ADMIN — ASPIRIN 81 MG 324 MG: 81 TABLET ORAL at 02:02

## 2022-09-17 RX ADMIN — ONDANSETRON 4 MG: 2 INJECTION INTRAMUSCULAR; INTRAVENOUS at 01:23

## 2022-09-17 RX ADMIN — IOPAMIDOL 100 ML: 755 INJECTION, SOLUTION INTRAVENOUS at 02:38

## 2022-09-17 RX ADMIN — LORAZEPAM 1 MG: 1 TABLET ORAL at 02:01

## 2022-09-17 RX ADMIN — MORPHINE SULFATE 4 MG: 2 INJECTION, SOLUTION INTRAMUSCULAR; INTRAVENOUS at 03:20

## 2022-09-17 RX ADMIN — SODIUM CHLORIDE 1000 ML: 9 INJECTION, SOLUTION INTRAVENOUS at 03:08

## 2022-09-17 RX ADMIN — LIDOCAINE HYDROCHLORIDE 15 ML: 20 SOLUTION ORAL; TOPICAL at 03:20

## 2022-09-17 RX ADMIN — ALUMINUM HYDROXIDE, MAGNESIUM HYDROXIDE, AND SIMETHICONE 30 ML: 200; 200; 20 SUSPENSION ORAL at 03:20

## 2022-09-17 RX ADMIN — SODIUM CHLORIDE 1000 ML: 9 INJECTION, SOLUTION INTRAVENOUS at 01:22

## 2022-09-17 ASSESSMENT — PAIN SCALES - GENERAL
PAINLEVEL_OUTOF10: 7
PAINLEVEL_OUTOF10: 8
PAINLEVEL_OUTOF10: 3

## 2022-09-17 ASSESSMENT — ENCOUNTER SYMPTOMS
VOMITING: 0
ABDOMINAL PAIN: 1
COUGH: 0
NAUSEA: 1
DIARRHEA: 0
CHEST TIGHTNESS: 1
BACK PAIN: 0
COLOR CHANGE: 0
SORE THROAT: 0
RHINORRHEA: 0
SHORTNESS OF BREATH: 1
CONSTIPATION: 0

## 2022-09-17 ASSESSMENT — PAIN DESCRIPTION - LOCATION: LOCATION: CHEST

## 2022-09-17 ASSESSMENT — PAIN - FUNCTIONAL ASSESSMENT
PAIN_FUNCTIONAL_ASSESSMENT: 0-10
PAIN_FUNCTIONAL_ASSESSMENT: 0-10

## 2022-09-17 NOTE — ED PROVIDER NOTES
MG TABLET    Take 1 tablet by mouth 4 times daily as needed for Nausea        Genia Raffi is a 32 y.o. male who presents to the Emergency Department with chief complaint of    Chief Complaint   Patient presents with    Chest Pain      Patient seen yesterday for abdominal pain nausea and diarrhea. This evening developed some chest tightness and shortness of breath. Symptoms have continued so came in for evaluation. The history is provided by the patient. No  was used. Chest Pain  Chest pain location: sternal.  Pain quality: tightness    Pain radiates to:  Does not radiate  Pain severity:  Moderate  Duration:  2 hours  Timing:  Constant  Progression:  Worsening  Chronicity:  New  Relieved by:  Nothing  Worsened by:  Nothing  Associated symptoms: abdominal pain, nausea, shortness of breath and weakness    Associated symptoms: no back pain, no cough, no diaphoresis, no fatigue, no fever, no headache, no lower extremity edema, no numbness, no palpitations and no vomiting      All other systems reviewed and are negative unless otherwise stated in the history of present illness section. Review of Systems   Constitutional:  Negative for chills, diaphoresis, fatigue and fever. HENT:  Negative for congestion, rhinorrhea and sore throat. Respiratory:  Positive for chest tightness and shortness of breath. Negative for cough. Cardiovascular:  Positive for chest pain. Negative for palpitations. Gastrointestinal:  Positive for abdominal pain and nausea. Negative for constipation, diarrhea and vomiting. Genitourinary:  Negative for dysuria and hematuria. Musculoskeletal:  Negative for back pain and neck pain. Skin:  Negative for color change and rash. Neurological:  Positive for weakness. Negative for numbness and headaches. All other systems reviewed and are negative.     Past Medical History:   Diagnosis Date    Cervical spondylosis     COVID-19 01/03/2022    Hypothyroidism due to Hashimoto's thyroiditis     MAO (obstructive sleep apnea) 2016    mild, CPAP    Right leg weakness     nml ncs 11/30/21    Vitamin D deficiency         Past Surgical History:   Procedure Laterality Date    CHOLECYSTECTOMY, LAPAROSCOPIC      ORTHOPEDIC SURGERY Bilateral 04/29/2022    cervical 4-6 medial branch block    WISDOM TOOTH EXTRACTION      2/4 removed        Family History   Problem Relation Age of Onset    No Known Problems Mother     Colon Cancer Neg Hx     No Known Problems Brother     No Known Problems Sister     No Known Problems Father         Social History     Socioeconomic History    Marital status:    Tobacco Use    Smoking status: Never    Smokeless tobacco: Never   Vaping Use    Vaping Use: Never used   Substance and Sexual Activity    Alcohol use: Never    Drug use: Never     Social Determinants of Health     Financial Resource Strain: Low Risk     Difficulty of Paying Living Expenses: Not hard at all   Food Insecurity: No Food Insecurity    Worried About Running Out of Food in the Last Year: Never true    Ran Out of Food in the Last Year: Never true        Allergies: Patient has no known allergies. Previous Medications    BACLOFEN (LIORESAL) 10 MG TABLET    TAKE 1 TABLET BY MOUTH DAILY FOR 1 WEEK. INCREASE TO 10 MG 2 TIMES A DAY FOR MUSCLE SPASMS    DIPHENOXYLATE-ATROPINE (LOMOTIL) 2.5-0.025 MG PER TABLET    Take 1 tablet by mouth 4 times daily as needed for Diarrhea for up to 10 days.     DULOXETINE (CYMBALTA) 30 MG EXTENDED RELEASE CAPSULE    Take 30 mg by mouth daily     FAMOTIDINE (PEPCID) 40 MG TABLET    Take 40 mg by mouth    HYOSCYAMINE SULFATE SL 0.125 MG SUBL    Place 1 tablet under the tongue 2 times daily as needed    ONDANSETRON (ZOFRAN) 4 MG TABLET    Take 1 tablet by mouth daily as needed for Nausea or Vomiting    ONDANSETRON (ZOFRAN-ODT) 4 MG DISINTEGRATING TABLET    Take 1 tablet by mouth 3 times daily as needed for Nausea or Vomiting    PANTOPRAZOLE (PROTONIX) 40 MG x-ray.    INDICATION: Chest pain. COMPARISON: None. TECHNIQUE: Single frontal view. FINDINGS: The lungs are clear. The cardiac size, mediastinal contour and  pulmonary vasculature are normal. No pneumothorax or pleural effusion is seen. The bones are intact. Impression    Normal chest x-ray. CT CHEST PULMONARY EMBOLISM W CONTRAST    Narrative    EXAM: CT Chest with IV contrast - PE protocol. INDICATION: Dyspnea, pain. COMPARISON: None. TECHNIQUE: Axial CT images of the chest were obtained after the intravenous  injection of 100 mL Isovue 370 CT contrast. Radiation dose reduction techniques  were used for this study. Our CT scanners use one or all of the following:   Automated exposure control, adjustment of the mA and/or kV according to patient  size, iterative reconstruction. FINDINGS:  - Pleura/pericardium: Within normal limits. - Lungs: Within normal limits. - Yaquelin/Mediastinum: Within normal limits. - Tracheobronchial tree: Within normal limits. - Aorta/pulmonary arteries: Within normal limits. - Heart: Within normal limits. - Coronary arteries: No coronary artery calcifications. - Chest wall: Mild bilateral gynecomastia. - Spine/bones: No acute process. - Additional comments: None. Impression    No acute abnormality.      Troponin T   Result Value Ref Range    Troponin T 7.1 0 - 22 ng/L   CBC with Auto Differential   Result Value Ref Range    WBC 10.6 4.3 - 11.1 K/uL    RBC 5.34 4.23 - 5.60 M/uL    Hemoglobin 16.0 13.6 - 17.2 g/dL    Hematocrit 45.6 41.1 - 50.3 %    MCV 85.4 79.6 - 97.8 FL    MCH 30.0 26.1 - 32.9 PG    MCHC 35.1 (H) 31.4 - 35.0 g/dL    RDW 12.6 11.9 - 14.6 %    Platelets 052 729 - 596 K/uL    MPV 9.7 9.4 - 12.3 FL    nRBC 0.00 0.0 - 0.2 K/uL    Differential Type AUTOMATED      Seg Neutrophils 78 43 - 78 %    Lymphocytes 12 (L) 13 - 44 %    Monocytes 9 4.0 - 12.0 %    Eosinophils % 0 (L) 0.5 - 7.8 %    Basophils 0 0.0 - 2.0 %    Immature Granulocytes 1 0.0 - 5.0 %    Segs Absolute 8.3 (H) 1.7 - 8.2 K/UL    Absolute Lymph # 1.3 0.5 - 4.6 K/UL    Absolute Mono # 0.9 0.1 - 1.3 K/UL    Absolute Eos # 0.0 0.0 - 0.8 K/UL    Basophils Absolute 0.0 0.0 - 0.2 K/UL    Absolute Immature Granulocyte 0.1 0.0 - 0.5 K/UL   Comprehensive Metabolic Panel   Result Value Ref Range    Sodium 135 (L) 138 - 145 mmol/L    Potassium 3.3 (L) 3.5 - 5.1 mmol/L    Chloride 98 98 - 107 mmol/L    CO2 25 21 - 32 mmol/L    Anion Gap 12 7.0 - 16.0 mmol/L    Glucose 118 (H) 65 - 100 mg/dL    BUN 10 7.0 - 18.0 MG/DL    Creatinine 0.94 0.8 - 1.5 MG/DL    GFR African American >122 >60 ml/min/1.73m2    GFR Non- >60 >60 ml/min/1.73m2    Calcium 9.2 8.3 - 10.4 MG/DL    Total Bilirubin 0.6 0.2 - 1.1 MG/DL    ALT 45 13.0 - 61.0 U/L    AST 31 15 - 37 U/L    Alk Phosphatase 65 45.0 - 117.0 U/L    Total Protein 7.4 6.4 - 8.2 g/dL    Albumin 4.7 3.5 - 5.0 g/dL    Globulin 2.7 2.3 - 3.5 g/dL    Albumin/Globulin Ratio 1.7     Lipase   Result Value Ref Range    Lipase 13 13 - 60 U/L   Lactic Acid   Result Value Ref Range    Lactic Acid 1.10 0.4 - 2.0 mmol/L   Troponin T   Result Value Ref Range    Troponin T 7.1 0 - 22 ng/L        CT CHEST PULMONARY EMBOLISM W CONTRAST   Final Result   No acute abnormality. XR CHEST PORTABLE   Final Result   Normal chest x-ray. Voice dictation software was used during the making of this note. This software is not perfect and grammatical and other typographical errors may be present. This note has not been completely proofread for errors.      Kemal Caballero III, MD  09/17/22 0306

## 2022-09-17 NOTE — ED NOTES
I have reviewed discharge instructions with the patient. The patient verbalized understanding. Patient left ED via Discharge Method: ambulatory to Home with self. Opportunity for questions and clarification provided. Patient given 1 scripts. To continue your aftercare when you leave the hospital, you may receive an automated call from our care team to check in on how you are doing. This is a free service and part of our promise to provide the best care and service to meet your aftercare needs.  If you have questions, or wish to unsubscribe from this service please call [phone]. Thank you for Choosing our [facility] Emergency Department.       Halley Chou RN  09/17/22 [id number]

## 2022-10-19 ENCOUNTER — TELEMEDICINE (OUTPATIENT)
Dept: FAMILY MEDICINE CLINIC | Facility: CLINIC | Age: 27
End: 2022-10-19
Payer: OTHER GOVERNMENT

## 2022-10-19 VITALS — WEIGHT: 220 LBS | BODY MASS INDEX: 32.58 KG/M2 | HEIGHT: 69 IN

## 2022-10-19 DIAGNOSIS — R19.7 DIARRHEA, UNSPECIFIED TYPE: Primary | ICD-10-CM

## 2022-10-19 PROCEDURE — 99213 OFFICE O/P EST LOW 20 MIN: CPT | Performed by: NURSE PRACTITIONER

## 2022-10-19 ASSESSMENT — PATIENT HEALTH QUESTIONNAIRE - PHQ9
2. FEELING DOWN, DEPRESSED OR HOPELESS: 0
SUM OF ALL RESPONSES TO PHQ QUESTIONS 1-9: 0
SUM OF ALL RESPONSES TO PHQ9 QUESTIONS 1 & 2: 0
SUM OF ALL RESPONSES TO PHQ QUESTIONS 1-9: 0
1. LITTLE INTEREST OR PLEASURE IN DOING THINGS: 0
SUM OF ALL RESPONSES TO PHQ QUESTIONS 1-9: 0
SUM OF ALL RESPONSES TO PHQ QUESTIONS 1-9: 0

## 2022-10-19 NOTE — PROGRESS NOTES
Nirav Trejo (:  1995) is a Established patient, here for evaluation of the following:  diarrhea  Assessment & Plan   Below is the assessment and plan developed based on review of pertinent history, physical exam, labs, studies, and medications. 1. Diarrhea, unspecified type  No follow-ups on file. Push fluids. Rest. Contact GI if not improved Given work note    Subjective   HPI He went to the er twice for issues with his stomach. Has been to GI about his stomach. He went to do a breath test. He went to eat and took his hycosamine but half way thru eating he had diarrhea. Is hungry but could not finish his meal can not keep food or liquid done since yesterday afternoon. This am stil with diarrhea and pain in stomach . He needs something to help with the cramping. He is taking pepcid and protonix and the hycosamine. He needs a work not for yesterday and today. Review of Systems     Nausea diarrhea stomach cramping no fever no body aches  Objective   Patient-Reported Vitals  No data recorded     Physical Exam     No acute distress        Nirav Trejo, was evaluated through a synchronous (real-time) audio-video encounter. The patient (or guardian if applicable) is aware that this is a billable service, which includes applicable co-pays. This Virtual Visit was conducted with patient's (and/or legal guardian's) consent. The visit was conducted pursuant to the emergency declaration under the [facility], [address] [place] [address] authority and the [employer] General Act. Patient identification was verified, and a caregiver was present when appropriate. The patient was located at Home: [address] [place] [address]. Provider was located at [facility] (Appt Dept): [id number] [facility].         --MARY Grider NP

## 2022-10-19 NOTE — LETTER
[address] [facility] [address] [address]  Phone: [phone]  Fax: [phone]    MARY Clark NP        October 19, 2022     Patient: Feliciano Santoro   YOB: 1995   Date of Visit: 10/19/2022       To Whom It May Concern:    Please excuse work absence for  [employer]  10/18 thru to 10/20/22    If you have any questions or concerns, please don't hesitate to call.     Sincerely,        MARY Clark NP

## 2022-11-14 ENCOUNTER — TELEMEDICINE (OUTPATIENT)
Dept: FAMILY MEDICINE CLINIC | Facility: CLINIC | Age: 27
End: 2022-11-14
Payer: OTHER GOVERNMENT

## 2022-11-14 VITALS — TEMPERATURE: 101 F

## 2022-11-14 DIAGNOSIS — J40 BRONCHITIS: Primary | ICD-10-CM

## 2022-11-14 PROCEDURE — 99213 OFFICE O/P EST LOW 20 MIN: CPT | Performed by: NURSE PRACTITIONER

## 2022-11-14 RX ORDER — DOXYCYCLINE HYCLATE 100 MG
100 TABLET ORAL 2 TIMES DAILY
Qty: 4 TABLET | Refills: 0 | Status: SHIPPED | OUTPATIENT
Start: 2022-11-14 | End: 2022-11-16

## 2022-11-14 RX ORDER — PROMETHAZINE HYDROCHLORIDE 25 MG/1
25 TABLET ORAL EVERY 6 HOURS PRN
COMMUNITY

## 2022-11-14 ASSESSMENT — PATIENT HEALTH QUESTIONNAIRE - PHQ9
SUM OF ALL RESPONSES TO PHQ9 QUESTIONS 1 & 2: 0
SUM OF ALL RESPONSES TO PHQ QUESTIONS 1-9: 0
1. LITTLE INTEREST OR PLEASURE IN DOING THINGS: 0
2. FEELING DOWN, DEPRESSED OR HOPELESS: 0
SUM OF ALL RESPONSES TO PHQ QUESTIONS 1-9: 0

## 2022-11-14 ASSESSMENT — ENCOUNTER SYMPTOMS: COUGH: 1

## 2022-11-14 NOTE — LETTER
[facility] [address] [address]  Phone: [phone]  Fax: [phone]    MARY Ahuja NP    November 14, 2022     Ari Alcaraz [address]    Patient: James Sinha   MR Number: [id number]   YOB: 1995   Date of Visit: 11/14/2022       Dear Ari Alcaraz:    Thank you for referring Delvis Benton to me for evaluation/treatment. Below are the relevant portions of my assessment and plan of care. If you have questions, please do not hesitate to call me. I look forward to following Dennis along with you.     Sincerely,      MARY Ahuja NP

## 2023-01-10 ENCOUNTER — TELEMEDICINE (OUTPATIENT)
Dept: FAMILY MEDICINE CLINIC | Facility: CLINIC | Age: 28
End: 2023-01-10
Payer: OTHER GOVERNMENT

## 2023-01-10 DIAGNOSIS — H02.846 SWELLING OF LEFT EYELID: Primary | ICD-10-CM

## 2023-01-10 DIAGNOSIS — N52.8 OTHER MALE ERECTILE DYSFUNCTION: ICD-10-CM

## 2023-01-10 PROCEDURE — 99214 OFFICE O/P EST MOD 30 MIN: CPT | Performed by: FAMILY MEDICINE

## 2023-01-10 RX ORDER — SILDENAFIL 100 MG/1
100 TABLET, FILM COATED ORAL PRN
COMMUNITY
End: 2023-01-10 | Stop reason: SDUPTHER

## 2023-01-10 RX ORDER — SILDENAFIL 100 MG/1
100 TABLET, FILM COATED ORAL PRN
Qty: 30 TABLET | Refills: 2 | Status: SHIPPED | OUTPATIENT
Start: 2023-01-10

## 2023-01-10 NOTE — PROGRESS NOTES
Esther Chavis is a 32 y.o. male who was seen by synchronous (real-time) audio-video technology on 1/10/2023. Subjective:   Esther Chavis was seen for Other (Feels sick, left eye lid is sensitive)  Pt has been sick since Sunday (1/8)  Left lower eyelid is swollen, painful to the touch. Says that he got sick in Nov 2021 and got sick and had a clogged duct in mouth that caused right sided neck swelling and    He says every time it rains, he gets so sick. He does not take allergy pill on a regular bases. Pt also asking  for viagra refill for ED    No Known Allergies      Objective:     General: alert, cooperative, and no distress   Mental  status: mental status: alert, oriented to person, place, and time, normal mood, behavior, speech, dress, motor activity, and thought processes   Resp: No distress. Neuro: No acute deficits   Skin: skin: left lower eyelid is visibly swollen and slightly erythematous on medial side     Due to this being a TeleHealth evaluation, many elements of the physical examination are unable to be assessed. Assessment & Plan:   Luis Vick was seen today for other. Diagnoses and all orders for this visit:    Swelling of left eyelid    Other male erectile dysfunction  -     sildenafil (VIAGRA) 100 MG tablet; Take 1 tablet by mouth as needed for Erectile Dysfunction (30 min prior to sexual activity)    Warm compresses with gentle massage to eyelid  He was asking if something was causing things to get blocked. He does have hx of hashimotos. We've done AI testing 7/2021 and all normal. Would have thought NADINE would be positive if he has sjogrens. Viagra refilled        CPT Codes 42104-25144 for Established Patients may apply to this Telehealth Visit    Esther Chavis was evaluated through a synchronous (real-time) audio-video encounter. The patient (or guardian if applicable) is aware that this is a billable service, which includes applicable co-pays.  This Virtual Visit was conducted with patient's (and/or leg guardian's) consent. The visit was conducted pursuant to the emergency declaration under the [address] [place] [address] waiver authority and the [employer] Act. Patient identification was verified, and a caregiver was present when appropriate. The patient was located in a state where the provider was licensed to provide care. I was at home while conducting this encounter. Pt was in his car. We discussed the expected course, resolution and complications of the diagnosis(es) in detail. Medication risks, benefits, costs, interactions, and alternatives were discussed as indicated. I advised him to contact the office if his condition worsens, changes or fails to improve as anticipated. He expressed understanding with the diagnosis(es) and plan.      Jessica Rodriguez, DO

## 2025-05-19 ENCOUNTER — OFFICE VISIT (OUTPATIENT)
Dept: INTERNAL MEDICINE CLINIC | Facility: CLINIC | Age: 30
End: 2025-05-19
Payer: OTHER GOVERNMENT

## 2025-05-19 VITALS
TEMPERATURE: 98 F | DIASTOLIC BLOOD PRESSURE: 78 MMHG | WEIGHT: 227 LBS | HEIGHT: 69 IN | RESPIRATION RATE: 16 BRPM | OXYGEN SATURATION: 94 % | BODY MASS INDEX: 33.62 KG/M2 | SYSTOLIC BLOOD PRESSURE: 120 MMHG | HEART RATE: 82 BPM

## 2025-05-19 DIAGNOSIS — J30.2 SEASONAL ALLERGIES: ICD-10-CM

## 2025-05-19 DIAGNOSIS — Z79.890 LONG-TERM CURRENT USE OF TESTOSTERONE REPLACEMENT THERAPY: ICD-10-CM

## 2025-05-19 DIAGNOSIS — E55.9 VITAMIN D DEFICIENCY: ICD-10-CM

## 2025-05-19 DIAGNOSIS — G47.33 OSA (OBSTRUCTIVE SLEEP APNEA): Primary | ICD-10-CM

## 2025-05-19 PROCEDURE — 99214 OFFICE O/P EST MOD 30 MIN: CPT | Performed by: INTERNAL MEDICINE

## 2025-05-19 RX ORDER — ANASTROZOLE 1 MG/1
1 TABLET ORAL DAILY
COMMUNITY

## 2025-05-19 SDOH — ECONOMIC STABILITY: FOOD INSECURITY: WITHIN THE PAST 12 MONTHS, THE FOOD YOU BOUGHT JUST DIDN'T LAST AND YOU DIDN'T HAVE MONEY TO GET MORE.: NEVER TRUE

## 2025-05-19 SDOH — ECONOMIC STABILITY: FOOD INSECURITY: WITHIN THE PAST 12 MONTHS, YOU WORRIED THAT YOUR FOOD WOULD RUN OUT BEFORE YOU GOT MONEY TO BUY MORE.: NEVER TRUE

## 2025-05-19 ASSESSMENT — PATIENT HEALTH QUESTIONNAIRE - PHQ9
1. LITTLE INTEREST OR PLEASURE IN DOING THINGS: NOT AT ALL
2. FEELING DOWN, DEPRESSED OR HOPELESS: NOT AT ALL
SUM OF ALL RESPONSES TO PHQ QUESTIONS 1-9: 0

## 2025-05-19 NOTE — PROGRESS NOTES
[facility] [address] [address]  Phone: [phone]  Fax: [phone]      Problem Based Overview with Integrated Assessment and Plan      History of Present Illness  The patient presents for evaluation of low testosterone, sleep apnea, allergies, and hypothyroidism.    Low Testosterone  - Currently undergoing TRT at [facility], which has improved his well-being.  - Self-administers injections three times a week using microdosing.  - Testosterone levels previously ranged from 200s to low 300s.  - Takes enclomiphene 25 mg three times a week and anastrozole 1 mg once a week.  - Reports no significant improvement in energy levels, attributing this to sleep apnea and nasal issues.  - Observes decline in muscle recovery rate, believes due to low testosterone.  - Considering discontinuing TRT and exploring natural ways to boost testosterone.    Sleep Apnea  - History of sleep apnea, underwent surgery on 04/27/2025 for deviated septum  - Reports improved breathing at night post-surgery.  - Used CPAP machine for several years, recently discontinued.    Allergies  - History of severe allergies, often leading to sinus, inner ear, and throat infections.  - Finds antibiotics most effective.  - Currently taking allergy medications.    Hypothyroidism  - History of hypothyroidism, developed into Hashimoto's disease.  - Thyroid function currently normal.    Supplemental information: PAST SURGICAL HISTORY:  - Septoplasty  - Bilateral turbinate reduction  - Bilateral maxillary antrostomy  - Bilateral ethmoidectomy       Assessment & Plan  1. Low testosterone.  - Total testosterone 794  - On TRT, injecting three times a week. Takes clomiphene 25 mg three times a week and anastrozole 1 mg once a week.  - Reports overall improvement but persistent fatigue, likely due to sleep issues.  - Continue current regimen and monitor symptoms.    2. Sleep apnea.  - History of sleep apnea,